# Patient Record
Sex: MALE | Race: WHITE
[De-identification: names, ages, dates, MRNs, and addresses within clinical notes are randomized per-mention and may not be internally consistent; named-entity substitution may affect disease eponyms.]

---

## 2017-12-08 ENCOUNTER — HOSPITAL ENCOUNTER (OUTPATIENT)
Dept: HOSPITAL 8 - LAB | Age: 72
End: 2017-12-08
Attending: NURSE PRACTITIONER
Payer: MEDICARE

## 2017-12-08 DIAGNOSIS — E78.1: Primary | ICD-10-CM

## 2017-12-08 DIAGNOSIS — R73.09: ICD-10-CM

## 2017-12-08 LAB
AST SERPL-CCNC: 18 U/L (ref 15–37)
BUN SERPL-MCNC: 13 MG/DL (ref 7–18)

## 2017-12-08 PROCEDURE — 82570 ASSAY OF URINE CREATININE: CPT

## 2017-12-08 PROCEDURE — 82043 UR ALBUMIN QUANTITATIVE: CPT

## 2017-12-08 PROCEDURE — 36415 COLL VENOUS BLD VENIPUNCTURE: CPT

## 2017-12-08 PROCEDURE — 80061 LIPID PANEL: CPT

## 2017-12-08 PROCEDURE — 80053 COMPREHEN METABOLIC PANEL: CPT

## 2017-12-09 LAB — CREAT UR-MCNC: 183.2 MG/DL

## 2019-07-10 ENCOUNTER — HOSPITAL ENCOUNTER (OUTPATIENT)
Dept: HOSPITAL 8 - CFH | Age: 74
Discharge: HOME | End: 2019-07-10
Attending: NURSE PRACTITIONER
Payer: MEDICARE

## 2019-07-10 DIAGNOSIS — R73.01: ICD-10-CM

## 2019-07-10 DIAGNOSIS — I10: ICD-10-CM

## 2019-07-10 DIAGNOSIS — E78.1: Primary | ICD-10-CM

## 2019-07-10 LAB
ALBUMIN SERPL-MCNC: 3.9 G/DL (ref 3.4–5)
ALP SERPL-CCNC: 86 U/L (ref 45–117)
ALT SERPL-CCNC: 28 U/L (ref 12–78)
ANION GAP SERPL CALC-SCNC: 6 MMOL/L (ref 5–15)
BILIRUB SERPL-MCNC: 0.8 MG/DL (ref 0.2–1)
CALCIUM SERPL-MCNC: 8.6 MG/DL (ref 8.5–10.1)
CHLORIDE SERPL-SCNC: 105 MMOL/L (ref 98–107)
CHOL/HDL RATIO: 4.2
CREAT SERPL-MCNC: 1.1 MG/DL (ref 0.7–1.3)
EST. AVERAGE GLUCOSE BLD GHB EST-MCNC: 126 MG/DL (ref 0–126)
HBA1C MFR BLD: 6 % (ref 4.2–6.3)
HDL CHOL %: 24 % (ref 26–37)
HDL CHOLESTEROL (DIRECT): 38 MG/DL (ref 40–60)
LDL CHOLESTEROL,CALCULATED: 64 MG/DL (ref 54–169)
LDLC/HDLC SERPL: 1.7 {RATIO} (ref 0.5–3)
PROT SERPL-MCNC: 7.7 G/DL (ref 6.4–8.2)
TRIGL SERPL-MCNC: 294 MG/DL (ref 50–200)
VLDLC SERPL CALC-MCNC: 59 MG/DL (ref 0–25)

## 2019-07-10 PROCEDURE — 83036 HEMOGLOBIN GLYCOSYLATED A1C: CPT

## 2019-07-10 PROCEDURE — 36415 COLL VENOUS BLD VENIPUNCTURE: CPT

## 2019-07-10 PROCEDURE — 80053 COMPREHEN METABOLIC PANEL: CPT

## 2019-07-10 PROCEDURE — 80061 LIPID PANEL: CPT

## 2019-10-29 ENCOUNTER — HOSPITAL ENCOUNTER (OUTPATIENT)
Dept: HOSPITAL 8 - CFH | Age: 74
Discharge: HOME | End: 2019-10-29
Attending: NURSE PRACTITIONER
Payer: MEDICARE

## 2019-10-29 DIAGNOSIS — M47.817: Primary | ICD-10-CM

## 2019-10-29 PROCEDURE — 72120 X-RAY BEND ONLY L-S SPINE: CPT

## 2021-01-15 DIAGNOSIS — Z23 NEED FOR VACCINATION: ICD-10-CM

## 2022-03-25 ENCOUNTER — NON-PROVIDER VISIT (OUTPATIENT)
Dept: CARDIOLOGY | Facility: MEDICAL CENTER | Age: 77
End: 2022-03-25
Payer: MEDICARE

## 2022-03-25 DIAGNOSIS — Z95.5 STENTED CORONARY ARTERY: Primary | ICD-10-CM

## 2022-03-25 PROCEDURE — G0423 INTENS CARDIAC REHAB NO EXER: HCPCS | Mod: 59 | Performed by: INTERNAL MEDICINE

## 2022-03-25 PROCEDURE — G0422 INTENS CARDIAC REHAB W/EXERC: HCPCS | Performed by: INTERNAL MEDICINE

## 2022-03-25 NOTE — PROGRESS NOTES
Patient arrived for initial 1:1 Intensive Cardiac Rehabilitation Consultation and Education session with the Registered Nurse.  A total of 60 minutes was spent with the patient during which time a focused cardiovascular assessment was completed and musculoskeletal limitations were addressed in preparation to safely start the exercise portion of the program.  Education regarding the program was explained including exercise goals, precautions, and target heart rate during exercise.  Nutrition goals were reviewed and patient was introduced to the Pritikin Program.  Stress management goals were dicussed and patient concerns and questions were answered at this time. Patient arrived for education at 1330 and visit was concluded at 1430. Exercise time was from 1430 to 1530.   Asim is looking forward to starting the program, he wants to lose weight and get into a regular exercise routine.  He also looks forward to having some ideas for stress management and has a goal of reading, eating healthier, losing weight.

## 2022-03-29 ENCOUNTER — NON-PROVIDER VISIT (OUTPATIENT)
Dept: CARDIOLOGY | Facility: MEDICAL CENTER | Age: 77
End: 2022-03-29
Payer: MEDICARE

## 2022-03-29 DIAGNOSIS — Z95.5 STENTED CORONARY ARTERY: ICD-10-CM

## 2022-03-29 PROCEDURE — G0422 INTENS CARDIAC REHAB W/EXERC: HCPCS | Performed by: INTERNAL MEDICINE

## 2022-03-29 PROCEDURE — G0423 INTENS CARDIAC REHAB NO EXER: HCPCS | Mod: 59 | Performed by: INTERNAL MEDICINE

## 2022-03-29 NOTE — PROGRESS NOTES
Asim Wise attended the following workshop from 1410 to 1500  Workshop Title: Targeting Nutritional Priorities      Lecture was attended and patient questions addressed. The patient will continue workshops and nutrition education.        Jose exercised from [id number].

## 2022-03-30 ENCOUNTER — NON-PROVIDER VISIT (OUTPATIENT)
Dept: CARDIOLOGY | Facility: MEDICAL CENTER | Age: 77
End: 2022-03-30
Payer: MEDICARE

## 2022-03-30 DIAGNOSIS — Z95.5 STENTED CORONARY ARTERY: ICD-10-CM

## 2022-03-30 PROCEDURE — G0423 INTENS CARDIAC REHAB NO EXER: HCPCS | Mod: 59 | Performed by: INTERNAL MEDICINE

## 2022-03-30 PROCEDURE — G0422 INTENS CARDIAC REHAB W/EXERC: HCPCS | Performed by: INTERNAL MEDICINE

## 2022-03-30 NOTE — PROGRESS NOTES
Asim exercised at [facility] from 1300 to 1400.    Asim Wise attended: cooking school from 1410 to 1500.    Today  prepared Savory Soups.    Patient received handouts and nutrition information regarding the specific recipes.

## 2022-03-31 ENCOUNTER — NON-PROVIDER VISIT (OUTPATIENT)
Dept: CARDIOLOGY | Facility: MEDICAL CENTER | Age: 77
End: 2022-03-31
Payer: MEDICARE

## 2022-03-31 DIAGNOSIS — Z95.5 STENTED CORONARY ARTERY: ICD-10-CM

## 2022-03-31 PROCEDURE — G0422 INTENS CARDIAC REHAB W/EXERC: HCPCS | Performed by: INTERNAL MEDICINE

## 2022-03-31 PROCEDURE — G0423 INTENS CARDIAC REHAB NO EXER: HCPCS | Mod: 59 | Performed by: INTERNAL MEDICINE

## 2022-03-31 NOTE — PROGRESS NOTES
Asim Wise attended the following workshop from 2:10 to 3:00 P.M.   Workshop Title: TARGETING NUTRITIONAL PRIORITIES      Lecture was attended and patient questions addressed. The patient will continue workshops and nutrition education.      Asim exercised from 1-2:00 p.m. today.

## 2022-04-05 ENCOUNTER — NON-PROVIDER VISIT (OUTPATIENT)
Dept: CARDIOLOGY | Facility: MEDICAL CENTER | Age: 77
End: 2022-04-05
Payer: MEDICARE

## 2022-04-05 DIAGNOSIS — Z95.5 STENTED CORONARY ARTERY: ICD-10-CM

## 2022-04-05 PROCEDURE — G0422 INTENS CARDIAC REHAB W/EXERC: HCPCS | Performed by: INTERNAL MEDICINE

## 2022-04-05 PROCEDURE — G0423 INTENS CARDIAC REHAB NO EXER: HCPCS | Mod: 59 | Performed by: INTERNAL MEDICINE

## 2022-04-12 ENCOUNTER — TELEPHONE (OUTPATIENT)
Dept: CARDIOLOGY | Facility: MEDICAL CENTER | Age: 77
End: 2022-04-12

## 2022-04-12 ENCOUNTER — NON-PROVIDER VISIT (OUTPATIENT)
Dept: CARDIOLOGY | Facility: MEDICAL CENTER | Age: 77
End: 2022-04-12
Payer: MEDICARE

## 2022-04-12 DIAGNOSIS — Z95.5 STENTED CORONARY ARTERY: ICD-10-CM

## 2022-04-12 PROCEDURE — G0422 INTENS CARDIAC REHAB W/EXERC: HCPCS | Performed by: INTERNAL MEDICINE

## 2022-04-12 PROCEDURE — G0423 INTENS CARDIAC REHAB NO EXER: HCPCS | Mod: 59 | Performed by: INTERNAL MEDICINE

## 2022-04-12 NOTE — PROGRESS NOTES
Asim Wise attended the following workshop from 1410 to 1500  Workshop Title: Fueling a healthy body      Lecture was attended and patient questions addressed. The patient will continue workshops and nutrition education.      Asim exercised from [id number]. We will be reaching to [place] cardiologist Dr. LOPEZ Chen regarding his elevated heart rates.

## 2022-04-13 ENCOUNTER — NON-PROVIDER VISIT (OUTPATIENT)
Dept: CARDIOLOGY | Facility: MEDICAL CENTER | Age: 77
End: 2022-04-13
Payer: MEDICARE

## 2022-04-13 DIAGNOSIS — Z95.5 STENTED CORONARY ARTERY: ICD-10-CM

## 2022-04-13 PROCEDURE — G0422 INTENS CARDIAC REHAB W/EXERC: HCPCS | Performed by: INTERNAL MEDICINE

## 2022-04-13 PROCEDURE — G0423 INTENS CARDIAC REHAB NO EXER: HCPCS | Mod: 59 | Performed by: INTERNAL MEDICINE

## 2022-04-13 NOTE — PROGRESS NOTES
Asim Wise attended: cooking school from 2:10 to 3:00 p.m.   Today  prepared TASTY APPETIZERS AND SNACKS.    Patient received handouts and nutrition information regarding the specific recipes.    Asim exercised from 1-2:00 p.m. today.

## 2022-04-14 ENCOUNTER — NON-PROVIDER VISIT (OUTPATIENT)
Dept: CARDIOLOGY | Facility: MEDICAL CENTER | Age: 77
End: 2022-04-14
Payer: MEDICARE

## 2022-04-14 DIAGNOSIS — Z95.5 STENTED CORONARY ARTERY: ICD-10-CM

## 2022-04-14 PROCEDURE — G0423 INTENS CARDIAC REHAB NO EXER: HCPCS | Mod: 59 | Performed by: INTERNAL MEDICINE

## 2022-04-14 PROCEDURE — G0422 INTENS CARDIAC REHAB W/EXERC: HCPCS | Performed by: INTERNAL MEDICINE

## 2022-04-14 NOTE — PROGRESS NOTES
Asim Wise attended: Exercise Workshop from 2:10  to 3:00 p.m.     The topic was: BIOMECHANICAL LIMITATIONS    Patient received handouts regarding the specific exercise information    Asim exercised from 1-2:00 p.m. today.

## 2022-04-19 ENCOUNTER — NON-PROVIDER VISIT (OUTPATIENT)
Dept: CARDIOLOGY | Facility: MEDICAL CENTER | Age: 77
End: 2022-04-19
Payer: MEDICARE

## 2022-04-19 DIAGNOSIS — Z95.5 STENTED CORONARY ARTERY: ICD-10-CM

## 2022-04-19 PROCEDURE — G0423 INTENS CARDIAC REHAB NO EXER: HCPCS | Mod: 59 | Performed by: INTERNAL MEDICINE

## 2022-04-19 PROCEDURE — G0422 INTENS CARDIAC REHAB W/EXERC: HCPCS | Performed by: INTERNAL MEDICINE

## 2022-04-19 NOTE — NON-PROVIDER
Video watched: 25 Hypertension and Heart Disease. Length: 32.7 minutes. Video, discussion and handout went from 2:10-3:00pm   Asim also exercised with us from 1:00-2:00pm

## 2022-04-20 ENCOUNTER — NON-PROVIDER VISIT (OUTPATIENT)
Dept: CARDIOLOGY | Facility: MEDICAL CENTER | Age: 77
End: 2022-04-20
Payer: MEDICARE

## 2022-04-20 DIAGNOSIS — Z95.5 STENTED CORONARY ARTERY: ICD-10-CM

## 2022-04-20 PROCEDURE — G0423 INTENS CARDIAC REHAB NO EXER: HCPCS | Mod: 59 | Performed by: INTERNAL MEDICINE

## 2022-04-20 PROCEDURE — G0422 INTENS CARDIAC REHAB W/EXERC: HCPCS | Performed by: INTERNAL MEDICINE

## 2022-04-20 NOTE — PROGRESS NOTES
Asim exercised at [facility] from 1300 to 1400.    Asim Wise attended: cooking school from 1410 to 1500.    Today  prepared Delicious Desserts.    Patient received handouts and nutrition information regarding the specific recipes.

## 2022-04-21 ENCOUNTER — NON-PROVIDER VISIT (OUTPATIENT)
Dept: CARDIOLOGY | Facility: MEDICAL CENTER | Age: 77
End: 2022-04-21
Payer: MEDICARE

## 2022-04-21 DIAGNOSIS — Z95.5 STENTED CORONARY ARTERY: ICD-10-CM

## 2022-04-21 PROCEDURE — G0423 INTENS CARDIAC REHAB NO EXER: HCPCS | Mod: 59 | Performed by: INTERNAL MEDICINE

## 2022-04-21 PROCEDURE — G0422 INTENS CARDIAC REHAB W/EXERC: HCPCS | Performed by: INTERNAL MEDICINE

## 2022-04-21 NOTE — NON-PROVIDER
Asim Wise attended: Healthy Mindset Workshop from 1:10-2:00  The topic was: Managing Heart Disease   Patient received handouts regarding the specific information  Asim also exercised today from 2:00-3:00

## 2022-04-26 ENCOUNTER — NON-PROVIDER VISIT (OUTPATIENT)
Dept: CARDIOLOGY | Facility: MEDICAL CENTER | Age: 77
End: 2022-04-26
Payer: MEDICARE

## 2022-04-26 DIAGNOSIS — Z95.5 STENTED CORONARY ARTERY: ICD-10-CM

## 2022-04-26 PROCEDURE — G0422 INTENS CARDIAC REHAB W/EXERC: HCPCS | Performed by: INTERNAL MEDICINE

## 2022-04-26 PROCEDURE — G0423 INTENS CARDIAC REHAB NO EXER: HCPCS | Mod: 59 | Performed by: INTERNAL MEDICINE

## 2022-04-26 NOTE — PROGRESS NOTES
Asim Wise attended the following video session  from 1410  to 1500  Video Title: Label Reading      Lecture was attended and patient questions addressed. The patient will continue workshops and nutrition education.      Asim exercised from [id number].

## 2022-04-27 ENCOUNTER — NON-PROVIDER VISIT (OUTPATIENT)
Dept: CARDIOLOGY | Facility: MEDICAL CENTER | Age: 77
End: 2022-04-27
Payer: MEDICARE

## 2022-04-27 DIAGNOSIS — Z95.5 STENTED CORONARY ARTERY: ICD-10-CM

## 2022-04-27 PROCEDURE — G0422 INTENS CARDIAC REHAB W/EXERC: HCPCS | Performed by: INTERNAL MEDICINE

## 2022-04-27 PROCEDURE — G0423 INTENS CARDIAC REHAB NO EXER: HCPCS | Mod: 59 | Performed by: INTERNAL MEDICINE

## 2022-04-27 NOTE — PROGRESS NOTES
Asim Wise attended: cooking school from 2:10  to 3:00 p.m.   Today  prepared: POWER HOUSE PLANT PROTEINS.    Patient received handouts and nutrition information regarding the specific recipes.    Asim exercised from 1-2:00 p.m. today.

## 2022-04-28 ENCOUNTER — NON-PROVIDER VISIT (OUTPATIENT)
Dept: CARDIOLOGY | Facility: MEDICAL CENTER | Age: 77
End: 2022-04-28
Payer: MEDICARE

## 2022-04-28 DIAGNOSIS — Z95.5 STENTED CORONARY ARTERY: ICD-10-CM

## 2022-04-28 PROCEDURE — G0422 INTENS CARDIAC REHAB W/EXERC: HCPCS | Performed by: INTERNAL MEDICINE

## 2022-04-28 PROCEDURE — G0423 INTENS CARDIAC REHAB NO EXER: HCPCS | Mod: 59 | Performed by: INTERNAL MEDICINE

## 2022-04-28 NOTE — PROGRESS NOTES
Asim exercised at [facility] from 1300 to 1400.    Asim Wise attended: Nutrition Workshop from 1410  to 1500.    The topic was: Label Reading #1.    Patient received handouts regarding the specific exercise information.    Kierra exercised at [facility] from 1500 to 1600.

## 2022-04-28 NOTE — NON-PROVIDER
Video watched: 25 Hypertension and Heart Disease. Length: 32.7 minutes. Video, handout and discussion went from 2:10-3:00  Asim also exercised from 1:00-2:00

## 2022-05-03 ENCOUNTER — NON-PROVIDER VISIT (OUTPATIENT)
Dept: CARDIOLOGY | Facility: MEDICAL CENTER | Age: 77
End: 2022-05-03
Payer: MEDICARE

## 2022-05-03 VITALS
BODY MASS INDEX: 28.04 KG/M2 | SYSTOLIC BLOOD PRESSURE: 114 MMHG | WEIGHT: 185 LBS | DIASTOLIC BLOOD PRESSURE: 76 MMHG | HEIGHT: 68 IN

## 2022-05-03 DIAGNOSIS — Z95.5 STENTED CORONARY ARTERY: ICD-10-CM

## 2022-05-03 PROCEDURE — G0422 INTENS CARDIAC REHAB W/EXERC: HCPCS | Performed by: INTERNAL MEDICINE

## 2022-05-03 PROCEDURE — G0423 INTENS CARDIAC REHAB NO EXER: HCPCS | Mod: 59 | Performed by: INTERNAL MEDICINE

## 2022-05-03 NOTE — NON-PROVIDER
Asim exercised today from 1-2:00pm.  Asim met with our dietician for his 1:1 nutrition consult today from 2:10-3:00pm

## 2022-05-03 NOTE — PROGRESS NOTES
Nutrition Consult Note by:    Tamiko Su RD, LDN  Patient name: Asim Wise  Date of visit:     2022    Start Time: 1400  End Time: 1450  Referring MD:   Dr. Chen    Referring diagnosis: Stent          Diagnosis code: Z95.5    Asim Wise is here today for Intensive Cardiac Rehab. Today the patient had their one-on-one RD appointment. This program includes Nutrition education and counseling following the Pritikin guidelines, which promote whole foods-fruits, vegetables, beans/legumes, whole grains, lean animal protein and not fat dairy-while avoiding added salt, refined/salty/sugary/fatty processed foods, trans/saturated fat, added sugar, tropical oils and heavy use of added oils.       Past Medical History:   Anxiety, ADHD, CAD, HLD, Hypertriglycemia, hyperglycemia, PreDiabetes      NUTRITION ASSESSMENT    Anthropometrics:  Height: 68 in     Male/Female:  Male  Weight: 198#     Age: 75yo   BMI: 28                BMI Class: Overweight  Goal weight: 185#    Nutrition-Related Labs:  Date:22          BP: 114/76              Normal             Date: 21  Glucose: 169    Date: 19              HbA1c: 6.0%               PreDiabetes                                                                                                     Date: 21   Total Cholesterol 16+4                                                                                        Date:    LDL: 62      VLDL: 66                                                                                         Date:                       HDL: 36                                                                                          Date:    T                                                                                                    Date:   21        eGFR:    57      Calcium:              Vit D:     PO4:      B12:       Potassium:      Other        Nutrition-related  Medications/Supplements:    LISINOPRIL, STATIN, ADDERALL    Social History:    Current tobacco use (Y/N): N  If yes, how frequently: QUIT CIGAR JAN 2022    Typical hours of sleep per night: 5 HOURS   Primary Food  and/or preparer in the household: SELF    Diet History:  Breakfast: PBJ [place] with Otto's killer bread, orange, potato chips    Lunch: n/a    Dinner: [place] or chicken, Broccoli or Asparagus, mash potato w/gravy    Snacks: chocolate bar    Beverages: water, SF Sprite 2daily     Digestive s/s:    None reported     Diet Discussion: increased water, reduce gravy, increase fruit intake, chose NSA jelly, reduce PB intake    Current Exercise:  45-60mins at [facility] x3 days per week   OTHER: Walking [employer] twice weekly    Physical Activity:           Previously---- Sedentary (little to no exercise)     Current----- Light (1-3x /wk)        Positive Changes Since Beginning the Program:   1. Feeling better with more energy and more awareness    Barriers to Change/Biggest Struggles:  1. Nutrition and grocery shopping, and patience     Nutrition SMART Goals:  1. Reduce red meat, and try to increase more plant based proteins  2. Reduce sugar intake, increase water intake to 8 glasses daily minimum     Exercise SMART Goals:  1. Increase walking, provided suggestions for other bruno for walking     Educational Handouts Provided:  Yesi Eating Plan          Healthy Plate method   Calcium food sources  Label Readings guidelines            Heart healthy snack ideas     Nutrition Diagnosis:   Problem:  Nutrition-related knowledge deficit    Related to: excessive energy intake     As Evidenced by: diet recall and patient reports lack of nutrition knowledge and frustration with knowing what to eat.     Follow Up PRN

## 2022-05-04 ENCOUNTER — NON-PROVIDER VISIT (OUTPATIENT)
Dept: CARDIOLOGY | Facility: MEDICAL CENTER | Age: 77
End: 2022-05-04
Payer: MEDICARE

## 2022-05-04 DIAGNOSIS — Z95.5 STENTED CORONARY ARTERY: ICD-10-CM

## 2022-05-04 PROCEDURE — G0422 INTENS CARDIAC REHAB W/EXERC: HCPCS | Performed by: INTERNAL MEDICINE

## 2022-05-04 PROCEDURE — G0423 INTENS CARDIAC REHAB NO EXER: HCPCS | Mod: 59 | Performed by: INTERNAL MEDICINE

## 2022-05-04 NOTE — NON-PROVIDER
Asim Wise attended: cooking school from 2:10-3:00 Today  prepared Easy Evening Meals   Patient received handouts and nutrition information regarding the specific recipes.  Asim exercised today from 1:00-2:00pm

## 2022-05-05 ENCOUNTER — NON-PROVIDER VISIT (OUTPATIENT)
Dept: CARDIOLOGY | Facility: MEDICAL CENTER | Age: 77
End: 2022-05-05
Payer: MEDICARE

## 2022-05-05 DIAGNOSIS — Z95.5 STENTED CORONARY ARTERY: ICD-10-CM

## 2022-05-05 PROCEDURE — G0423 INTENS CARDIAC REHAB NO EXER: HCPCS | Mod: 59 | Performed by: INTERNAL MEDICINE

## 2022-05-05 PROCEDURE — G0422 INTENS CARDIAC REHAB W/EXERC: HCPCS | Performed by: INTERNAL MEDICINE

## 2022-05-05 NOTE — NON-PROVIDER
Asim Wise attended: Exercise Workshop from 1:10-2:00pm The topic was: Fall & Balance Training   Patient received handouts regarding the specific exercise information  Asim exercised today from 2:00-3:00

## 2022-05-10 ENCOUNTER — NON-PROVIDER VISIT (OUTPATIENT)
Dept: CARDIOLOGY | Facility: MEDICAL CENTER | Age: 77
End: 2022-05-10
Payer: MEDICARE

## 2022-05-10 DIAGNOSIS — Z95.5 STENTED CORONARY ARTERY: ICD-10-CM

## 2022-05-10 PROCEDURE — G0422 INTENS CARDIAC REHAB W/EXERC: HCPCS | Performed by: INTERNAL MEDICINE

## 2022-05-10 PROCEDURE — G0423 INTENS CARDIAC REHAB NO EXER: HCPCS | Mod: 59 | Performed by: INTERNAL MEDICINE

## 2022-05-10 NOTE — PROGRESS NOTES
Asim Wise watched the following video from 2:10 pm to 3:00 pm.  Workshop Title: Dining Out Part 1      Video was watched and patient questions addressed. The patient will continue attending workshops and watching educational videos.    Asim exercised from 1:00 pm to 2:00 pm.

## 2022-05-11 ENCOUNTER — NON-PROVIDER VISIT (OUTPATIENT)
Dept: CARDIOLOGY | Facility: MEDICAL CENTER | Age: 77
End: 2022-05-11
Payer: MEDICARE

## 2022-05-11 DIAGNOSIS — Z95.5 STENTED CORONARY ARTERY: ICD-10-CM

## 2022-05-11 PROCEDURE — G0422 INTENS CARDIAC REHAB W/EXERC: HCPCS | Performed by: INTERNAL MEDICINE

## 2022-05-11 PROCEDURE — G0423 INTENS CARDIAC REHAB NO EXER: HCPCS | Mod: 59 | Performed by: INTERNAL MEDICINE

## 2022-05-11 NOTE — NON-PROVIDER
Asim attended cooking school on Comforting Breakfasts today from 2:10-3:00 and exercised from 1:00-2:00pm

## 2022-05-12 ENCOUNTER — NON-PROVIDER VISIT (OUTPATIENT)
Dept: CARDIOLOGY | Facility: MEDICAL CENTER | Age: 77
End: 2022-05-12
Payer: MEDICARE

## 2022-05-12 DIAGNOSIS — Z95.5 STENTED CORONARY ARTERY: ICD-10-CM

## 2022-05-12 PROCEDURE — G0423 INTENS CARDIAC REHAB NO EXER: HCPCS | Mod: 59 | Performed by: INTERNAL MEDICINE

## 2022-05-12 PROCEDURE — G0422 INTENS CARDIAC REHAB W/EXERC: HCPCS | Performed by: INTERNAL MEDICINE

## 2022-05-17 ENCOUNTER — NON-PROVIDER VISIT (OUTPATIENT)
Dept: CARDIOLOGY | Facility: MEDICAL CENTER | Age: 77
End: 2022-05-17
Payer: MEDICARE

## 2022-05-17 DIAGNOSIS — Z95.5 STENTED CORONARY ARTERY: ICD-10-CM

## 2022-05-17 PROCEDURE — G0423 INTENS CARDIAC REHAB NO EXER: HCPCS | Mod: 59 | Performed by: INTERNAL MEDICINE

## 2022-05-17 PROCEDURE — G0422 INTENS CARDIAC REHAB W/EXERC: HCPCS | Performed by: INTERNAL MEDICINE

## 2022-05-17 NOTE — NON-PROVIDER
Asim exercised today from 1:00-2:00   And watched the video Healthy Minds, Bodies and Hearts from 2:10-3:00pm

## 2022-05-18 ENCOUNTER — NON-PROVIDER VISIT (OUTPATIENT)
Dept: CARDIOLOGY | Facility: MEDICAL CENTER | Age: 77
End: 2022-05-18
Payer: MEDICARE

## 2022-05-18 DIAGNOSIS — Z95.5 STENTED CORONARY ARTERY: ICD-10-CM

## 2022-05-18 PROCEDURE — G0422 INTENS CARDIAC REHAB W/EXERC: HCPCS | Performed by: INTERNAL MEDICINE

## 2022-05-18 PROCEDURE — G0423 INTENS CARDIAC REHAB NO EXER: HCPCS | Mod: 59 | Performed by: INTERNAL MEDICINE

## 2022-05-18 NOTE — PROGRESS NOTES
Asim exercised at [facility] from 1300 to 1400.     Asim Wise attended: cooking school from 1410 to 1500.    Today  prepared Meals in a Snap.    Patient received handouts and nutrition information regarding the specific recipes.

## 2022-05-19 ENCOUNTER — NON-PROVIDER VISIT (OUTPATIENT)
Dept: CARDIOLOGY | Facility: MEDICAL CENTER | Age: 77
End: 2022-05-19
Payer: MEDICARE

## 2022-05-19 DIAGNOSIS — Z95.5 STENTED CORONARY ARTERY: ICD-10-CM

## 2022-05-19 PROCEDURE — G0422 INTENS CARDIAC REHAB W/EXERC: HCPCS | Performed by: INTERNAL MEDICINE

## 2022-05-19 PROCEDURE — G0423 INTENS CARDIAC REHAB NO EXER: HCPCS | Mod: 59 | Performed by: INTERNAL MEDICINE

## 2022-05-19 NOTE — PROGRESS NOTES
Asim Wise attended: Healthy Mindset Workshop from 2:10 to 3:00 p.m.     The topic was: MANAGING MOODS AND RELATION    Patient received handouts regarding the specific information    Jose exercised from 1-2:00 p.m. today.

## 2022-05-24 ENCOUNTER — NON-PROVIDER VISIT (OUTPATIENT)
Dept: CARDIOLOGY | Facility: MEDICAL CENTER | Age: 77
End: 2022-05-24
Payer: MEDICARE

## 2022-05-24 DIAGNOSIS — Z95.5 STENTED CORONARY ARTERY: ICD-10-CM

## 2022-05-24 PROCEDURE — G0423 INTENS CARDIAC REHAB NO EXER: HCPCS | Mod: 59 | Performed by: INTERNAL MEDICINE

## 2022-05-24 PROCEDURE — G0422 INTENS CARDIAC REHAB W/EXERC: HCPCS | Performed by: INTERNAL MEDICINE

## 2022-05-24 NOTE — NON-PROVIDER
Video watched: 31 Decoding Lab Results. Length: 32.7 minutes. Asim watched the above video, was given a handout and had discussion that went from 2:10-3:00 pm  Asim also exercised from 1:00-2:00

## 2022-05-26 ENCOUNTER — NON-PROVIDER VISIT (OUTPATIENT)
Dept: CARDIOLOGY | Facility: MEDICAL CENTER | Age: 77
End: 2022-05-26
Payer: MEDICARE

## 2022-05-26 DIAGNOSIS — Z95.5 STENTED CORONARY ARTERY: ICD-10-CM

## 2022-05-26 PROCEDURE — G0423 INTENS CARDIAC REHAB NO EXER: HCPCS | Mod: 59 | Performed by: INTERNAL MEDICINE

## 2022-05-26 PROCEDURE — G0422 INTENS CARDIAC REHAB W/EXERC: HCPCS | Performed by: INTERNAL MEDICINE

## 2022-05-26 NOTE — PROGRESS NOTES
Asim exercised at [facility] from from 1300 to 1400.    Asim Wise attended: Healthy Mindset Workshop from 1410 to 1500.      The topic was: Mindful Eating.    Patient received handouts regarding the specific information.

## 2022-05-31 ENCOUNTER — NON-PROVIDER VISIT (OUTPATIENT)
Dept: CARDIOLOGY | Facility: MEDICAL CENTER | Age: 77
End: 2022-05-31
Payer: MEDICARE

## 2022-05-31 DIAGNOSIS — Z95.5 STENTED CORONARY ARTERY: ICD-10-CM

## 2022-05-31 PROCEDURE — G0423 INTENS CARDIAC REHAB NO EXER: HCPCS | Mod: 59 | Performed by: INTERNAL MEDICINE

## 2022-05-31 PROCEDURE — G0422 INTENS CARDIAC REHAB W/EXERC: HCPCS | Performed by: INTERNAL MEDICINE

## 2022-05-31 NOTE — PROGRESS NOTES
Asim exercised at [facility] from 1300 to 1400.    Patient watched video: 26 Improve Performance. Length: 32.2 minutes.    Video, handout, and discussion went from 1410 to 1500.

## 2022-06-01 ENCOUNTER — NON-PROVIDER VISIT (OUTPATIENT)
Dept: CARDIOLOGY | Facility: MEDICAL CENTER | Age: 77
End: 2022-06-01
Payer: MEDICARE

## 2022-06-01 DIAGNOSIS — Z95.5 STENTED CORONARY ARTERY: ICD-10-CM

## 2022-06-01 PROCEDURE — G0422 INTENS CARDIAC REHAB W/EXERC: HCPCS | Performed by: INTERNAL MEDICINE

## 2022-06-01 PROCEDURE — G0423 INTENS CARDIAC REHAB NO EXER: HCPCS | Mod: 59 | Performed by: INTERNAL MEDICINE

## 2022-06-01 NOTE — PROGRESS NOTES
Asim exercised at [facility] from 1300 to 1400.    Asim Wise attended the following workshop from 1410 [id number].    Workshop Title: Jeopardy       Lecture was attended and patient questions addressed. The patient will continue workshops and nutrition education.

## 2022-06-02 ENCOUNTER — NON-PROVIDER VISIT (OUTPATIENT)
Dept: CARDIOLOGY | Facility: MEDICAL CENTER | Age: 77
End: 2022-06-02
Payer: MEDICARE

## 2022-06-02 DIAGNOSIS — Z95.5 STENTED CORONARY ARTERY: ICD-10-CM

## 2022-06-02 PROCEDURE — G0423 INTENS CARDIAC REHAB NO EXER: HCPCS | Mod: 59 | Performed by: INTERNAL MEDICINE

## 2022-06-02 PROCEDURE — G0422 INTENS CARDIAC REHAB W/EXERC: HCPCS | Performed by: INTERNAL MEDICINE

## 2022-06-02 NOTE — PROGRESS NOTES
Asim Wise attended: Exercise Workshop from 1410  to 1500    The topic was: Building Your Action Plan    Patient received handouts regarding the specific exercise information    Asim exercised from [id number].

## 2022-06-07 ENCOUNTER — NON-PROVIDER VISIT (OUTPATIENT)
Dept: CARDIOLOGY | Facility: MEDICAL CENTER | Age: 77
End: 2022-06-07
Payer: MEDICARE

## 2022-06-07 DIAGNOSIS — Z95.5 STENTED CORONARY ARTERY: ICD-10-CM

## 2022-06-07 PROCEDURE — G0422 INTENS CARDIAC REHAB W/EXERC: HCPCS | Performed by: INTERNAL MEDICINE

## 2022-06-07 PROCEDURE — G0423 INTENS CARDIAC REHAB NO EXER: HCPCS | Mod: 59 | Performed by: INTERNAL MEDICINE

## 2022-06-07 NOTE — PROGRESS NOTES
Video watched: 24 Metabolic Syndrome and Belly Fat. Length: 31.9 minutes.    Video, discussion and handout went from 2:10 to 3:00 p.m.     Jose exercised from 1-2:00 p.m.today.

## 2022-06-08 ENCOUNTER — NON-PROVIDER VISIT (OUTPATIENT)
Dept: CARDIOLOGY | Facility: MEDICAL CENTER | Age: 77
End: 2022-06-08
Payer: MEDICARE

## 2022-06-08 DIAGNOSIS — Z95.5 STENTED CORONARY ARTERY: ICD-10-CM

## 2022-06-08 PROCEDURE — G0422 INTENS CARDIAC REHAB W/EXERC: HCPCS | Performed by: INTERNAL MEDICINE

## 2022-06-08 PROCEDURE — G0423 INTENS CARDIAC REHAB NO EXER: HCPCS | Mod: 59 | Performed by: INTERNAL MEDICINE

## 2022-06-08 NOTE — PROGRESS NOTES
[facility] to 1400.    Asim Wise attended: cooking school from 1410 to 1500.    Today  prepared Fast and Healthy Breakfasts.    Patient received handouts and nutrition information regarding the specific recipes.

## 2022-06-09 ENCOUNTER — TELEPHONE (OUTPATIENT)
Dept: CARDIOLOGY | Facility: MEDICAL CENTER | Age: 77
End: 2022-06-09

## 2022-06-09 ENCOUNTER — NON-PROVIDER VISIT (OUTPATIENT)
Dept: CARDIOLOGY | Facility: MEDICAL CENTER | Age: 77
End: 2022-06-09
Payer: MEDICARE

## 2022-06-09 DIAGNOSIS — Z95.5 STENTED CORONARY ARTERY: ICD-10-CM

## 2022-06-09 PROCEDURE — G0423 INTENS CARDIAC REHAB NO EXER: HCPCS | Mod: 59 | Performed by: INTERNAL MEDICINE

## 2022-06-09 PROCEDURE — G0422 INTENS CARDIAC REHAB W/EXERC: HCPCS | Performed by: INTERNAL MEDICINE

## 2022-06-09 NOTE — PROGRESS NOTES
Video watched: 12 Cooking School Breakfast and Snacks. Length: 32.0 minutes with discussion following.  From 1410 To 1500. Handouts were given.     Asim exercised from [id number].

## 2022-06-14 ENCOUNTER — NON-PROVIDER VISIT (OUTPATIENT)
Dept: CARDIOLOGY | Facility: MEDICAL CENTER | Age: 77
End: 2022-06-14
Payer: MEDICARE

## 2022-06-14 DIAGNOSIS — Z95.5 STENTED CORONARY ARTERY: ICD-10-CM

## 2022-06-14 PROCEDURE — G0423 INTENS CARDIAC REHAB NO EXER: HCPCS | Mod: 59 | Performed by: INTERNAL MEDICINE

## 2022-06-14 PROCEDURE — G0422 INTENS CARDIAC REHAB W/EXERC: HCPCS | Performed by: INTERNAL MEDICINE

## 2022-06-14 NOTE — PROGRESS NOTES
Asim exercised at [facility] from 1300 to 1400.    Patient watched video: 28 How Our Thoughts Can Heal Our Hearts. Length: 33.08 minutes.    Video, handouts, and discussion went from 1410 to 1500.

## 2022-06-15 ENCOUNTER — NON-PROVIDER VISIT (OUTPATIENT)
Dept: CARDIOLOGY | Facility: MEDICAL CENTER | Age: 77
End: 2022-06-15
Payer: MEDICARE

## 2022-06-15 DIAGNOSIS — Z95.5 STENTED CORONARY ARTERY: ICD-10-CM

## 2022-06-15 PROCEDURE — G0422 INTENS CARDIAC REHAB W/EXERC: HCPCS | Performed by: INTERNAL MEDICINE

## 2022-06-15 PROCEDURE — G0423 INTENS CARDIAC REHAB NO EXER: HCPCS | Mod: 59 | Performed by: INTERNAL MEDICINE

## 2022-06-15 NOTE — PROGRESS NOTES
Asim exercised at [facility] from 1300 to 1400.    Asim Wise attended: cooking school from 1410 to 1500.    Today  prepared Easy Salads & Dressings.    Patient received handouts and nutrition information regarding the specific recipes.

## 2022-06-16 ENCOUNTER — TELEPHONE (OUTPATIENT)
Dept: CARDIOLOGY | Facility: MEDICAL CENTER | Age: 77
End: 2022-06-16

## 2022-06-21 ENCOUNTER — NON-PROVIDER VISIT (OUTPATIENT)
Dept: CARDIOLOGY | Facility: MEDICAL CENTER | Age: 77
End: 2022-06-21
Payer: MEDICARE

## 2022-06-21 DIAGNOSIS — Z95.5 STENTED CORONARY ARTERY: ICD-10-CM

## 2022-06-21 PROCEDURE — G0422 INTENS CARDIAC REHAB W/EXERC: HCPCS | Performed by: INTERNAL MEDICINE

## 2022-06-21 PROCEDURE — G0423 INTENS CARDIAC REHAB NO EXER: HCPCS | Mod: 59 | Performed by: INTERNAL MEDICINE

## 2022-06-21 NOTE — PROGRESS NOTES
Asim exercised at [facility] from 1300 to 1400.    Asim watched video: 20 Targeting Your Nutrition Priorities. Length: 31.7 minutes.    Video, handout, and discussion went from 1410 to 1500.

## 2022-06-22 ENCOUNTER — NON-PROVIDER VISIT (OUTPATIENT)
Dept: CARDIOLOGY | Facility: MEDICAL CENTER | Age: 77
End: 2022-06-22
Payer: MEDICARE

## 2022-06-22 DIAGNOSIS — Z95.5 STENTED CORONARY ARTERY: ICD-10-CM

## 2022-06-22 PROCEDURE — G0422 INTENS CARDIAC REHAB W/EXERC: HCPCS | Performed by: INTERNAL MEDICINE

## 2022-06-22 PROCEDURE — G0423 INTENS CARDIAC REHAB NO EXER: HCPCS | Mod: 59 | Performed by: INTERNAL MEDICINE

## 2022-06-22 NOTE — PROGRESS NOTES
Asim Wise attended: cooking school from 1410 to 1500  Today  prepared savory soups.    Patient received handouts and nutrition information regarding the specific recipes.    Jose exercised from [phone] today.

## 2022-06-23 ENCOUNTER — NON-PROVIDER VISIT (OUTPATIENT)
Dept: CARDIOLOGY | Facility: MEDICAL CENTER | Age: 77
End: 2022-06-23
Payer: MEDICARE

## 2022-06-23 DIAGNOSIS — Z95.5 STENTED CORONARY ARTERY: ICD-10-CM

## 2022-06-23 PROCEDURE — G0423 INTENS CARDIAC REHAB NO EXER: HCPCS | Mod: 59 | Performed by: INTERNAL MEDICINE

## 2022-06-23 PROCEDURE — G0422 INTENS CARDIAC REHAB W/EXERC: HCPCS | Performed by: INTERNAL MEDICINE

## 2022-06-23 NOTE — PROGRESS NOTES
Asim exercised at [facility] from 1300 to 1400.    Asim Wise attended the following workshop from 1410 to 1500.    Workshop Title: Targeting Nutritional Priorities.    Lecture was attended and patient questions addressed. The patient will continue workshops and nutrition education.

## 2022-11-04 ENCOUNTER — PATIENT MESSAGE (OUTPATIENT)
Dept: HEALTH INFORMATION MANAGEMENT | Facility: OTHER | Age: 77
End: 2022-11-04

## 2024-10-01 ENCOUNTER — TELEPHONE (OUTPATIENT)
Dept: CARDIOLOGY | Facility: MEDICAL CENTER | Age: 79
End: 2024-10-01
Payer: MEDICARE

## 2024-10-10 ENCOUNTER — TELEPHONE (OUTPATIENT)
Dept: CARDIOLOGY | Facility: MEDICAL CENTER | Age: 79
End: 2024-10-10
Payer: MEDICARE

## 2024-10-30 ENCOUNTER — OFFICE VISIT (OUTPATIENT)
Dept: CARDIOLOGY | Facility: MEDICAL CENTER | Age: 79
End: 2024-10-30
Attending: INTERNAL MEDICINE
Payer: MEDICARE

## 2024-10-30 VITALS
SYSTOLIC BLOOD PRESSURE: 116 MMHG | BODY MASS INDEX: 29.86 KG/M2 | HEIGHT: 68 IN | HEART RATE: 92 BPM | OXYGEN SATURATION: 91 % | WEIGHT: 197 LBS | RESPIRATION RATE: 18 BRPM | DIASTOLIC BLOOD PRESSURE: 62 MMHG

## 2024-10-30 DIAGNOSIS — R09.02 HYPOXEMIA: ICD-10-CM

## 2024-10-30 DIAGNOSIS — Q21.12 PFO (PATENT FORAMEN OVALE): ICD-10-CM

## 2024-10-30 PROBLEM — I21.3 STEMI (ST ELEVATION MYOCARDIAL INFARCTION) (HCC): Status: ACTIVE | Noted: 2024-10-30

## 2024-10-30 PROBLEM — B96.81 GASTRIC ULCER DUE TO HELICOBACTER PYLORI: Status: ACTIVE | Noted: 2024-10-30

## 2024-10-30 PROBLEM — F41.9 ANXIETY DISORDER: Status: ACTIVE | Noted: 2024-10-30

## 2024-10-30 PROBLEM — M54.9 CHRONIC BACK PAIN: Status: ACTIVE | Noted: 2024-10-30

## 2024-10-30 PROBLEM — E78.5 HLD (HYPERLIPIDEMIA): Status: ACTIVE | Noted: 2024-10-30

## 2024-10-30 PROBLEM — K25.9 GASTRIC ULCER DUE TO HELICOBACTER PYLORI: Status: ACTIVE | Noted: 2024-10-30

## 2024-10-30 PROBLEM — D12.0 BENIGN NEOPLASM OF CECUM: Status: ACTIVE | Noted: 2018-06-05

## 2024-10-30 PROBLEM — G89.29 CHRONIC BACK PAIN: Status: ACTIVE | Noted: 2024-10-30

## 2024-10-30 PROBLEM — F32.A DEPRESSION: Status: ACTIVE | Noted: 2024-10-30

## 2024-10-30 PROBLEM — F98.8 ADD (ATTENTION DEFICIT DISORDER): Status: ACTIVE | Noted: 2024-10-30

## 2024-10-30 PROCEDURE — 99202 OFFICE O/P NEW SF 15 MIN: CPT | Performed by: INTERNAL MEDICINE

## 2024-10-30 RX ORDER — DEXTROAMPHETAMINE SACCHARATE, AMPHETAMINE ASPARTATE, DEXTROAMPHETAMINE SULFATE AND AMPHETAMINE SULFATE 2.5; 2.5; 2.5; 2.5 MG/1; MG/1; MG/1; MG/1
10 TABLET ORAL 2 TIMES DAILY
COMMUNITY

## 2024-10-30 RX ORDER — HYDROCODONE BITARTRATE AND ACETAMINOPHEN 10; 325 MG/1; MG/1
1 TABLET ORAL 3 TIMES DAILY PRN
COMMUNITY

## 2024-10-30 RX ORDER — ASPIRIN 81 MG/1
81 TABLET ORAL DAILY
COMMUNITY

## 2024-10-30 RX ORDER — LISINOPRIL 10 MG/1
10 TABLET ORAL
COMMUNITY
Start: 2024-10-13

## 2024-10-30 RX ORDER — VENLAFAXINE HYDROCHLORIDE 75 MG/1
75 CAPSULE, EXTENDED RELEASE ORAL
COMMUNITY

## 2024-10-30 RX ORDER — ATORVASTATIN CALCIUM 40 MG/1
40 TABLET, FILM COATED ORAL
COMMUNITY
Start: 2024-10-13

## 2024-10-30 RX ORDER — ZOLPIDEM TARTRATE 10 MG/1
10 TABLET ORAL
COMMUNITY
Start: 2024-10-29

## 2024-10-30 ASSESSMENT — FIBROSIS 4 INDEX: FIB4 SCORE: 1.09

## 2024-11-01 ENCOUNTER — HOSPITAL ENCOUNTER (OUTPATIENT)
Dept: RADIOLOGY | Facility: MEDICAL CENTER | Age: 79
End: 2024-11-01
Payer: MEDICARE